# Patient Record
Sex: MALE | Race: WHITE
[De-identification: names, ages, dates, MRNs, and addresses within clinical notes are randomized per-mention and may not be internally consistent; named-entity substitution may affect disease eponyms.]

---

## 2023-08-04 ENCOUNTER — HOSPITAL ENCOUNTER (EMERGENCY)
Dept: HOSPITAL 33 - ED | Age: 58
Discharge: HOME | End: 2023-08-04
Payer: COMMERCIAL

## 2023-08-04 VITALS
OXYGEN SATURATION: 98 % | HEART RATE: 78 BPM | DIASTOLIC BLOOD PRESSURE: 77 MMHG | RESPIRATION RATE: 18 BRPM | SYSTOLIC BLOOD PRESSURE: 135 MMHG

## 2023-08-04 VITALS — TEMPERATURE: 98 F

## 2023-08-04 DIAGNOSIS — Z28.310: ICD-10-CM

## 2023-08-04 DIAGNOSIS — K86.2: ICD-10-CM

## 2023-08-04 DIAGNOSIS — S32.018A: ICD-10-CM

## 2023-08-04 DIAGNOSIS — Z72.0: ICD-10-CM

## 2023-08-04 DIAGNOSIS — R10.32: Primary | ICD-10-CM

## 2023-08-04 DIAGNOSIS — S22.088A: ICD-10-CM

## 2023-08-04 LAB
ALBUMIN SERPL-MCNC: 4.2 G/DL (ref 3.5–5)
ALP SERPL-CCNC: 86 U/L (ref 38–126)
ALT SERPL-CCNC: 27 U/L (ref 0–50)
ANION GAP SERPL CALC-SCNC: 13.2 MEQ/L (ref 5–15)
AST SERPL QL: 33 U/L (ref 17–59)
BACTERIA UR CULT: NO
BASOPHILS # BLD AUTO: 0.04 X10^3/UL (ref 0–0.4)
BASOPHILS NFR BLD AUTO: 0.6 % (ref 0–0.4)
BILIRUB BLD-MCNC: 0.8 MG/DL (ref 0.2–1.3)
BUN SERPL-MCNC: 14 MG/DL (ref 9–20)
CALCIUM SPEC-MCNC: 8.9 MG/DL (ref 8.4–10.2)
CHLORIDE SERPL-SCNC: 103 MMOL/L (ref 98–107)
CO2 SERPL-SCNC: 25 MMOL/L (ref 22–30)
CREAT SERPL-MCNC: 0.9 MG/DL (ref 0.66–1.25)
EOSINOPHIL # BLD AUTO: 0.07 X10^3/UL (ref 0–0.5)
GFR SERPLBLD BASED ON 1.73 SQ M-ARVRAT: > 60 ML/MIN
GLUCOSE SERPL-MCNC: 151 MG/DL (ref 74–106)
HCT VFR BLD AUTO: 41.8 % (ref 42–50)
HGB BLD-MCNC: 13.9 G/DL (ref 12.5–18)
IMM GRANULOCYTES # BLD: 0.01 X10^3U/L (ref 0–0.03)
IMM GRANULOCYTES NFR BLD: 0.1 % (ref 0–0.4)
LIPASE SERPL-CCNC: 100 U/L (ref 23–300)
LYMPHOCYTES # SPEC AUTO: 1.62 X10^3/UL (ref 1–4.6)
MCH RBC QN AUTO: 31.1 PG (ref 26–32)
MCHC RBC AUTO-ENTMCNC: 33.3 G/DL (ref 32–36)
MONOCYTES # BLD AUTO: 0.41 X10^3/UL (ref 0–1.3)
NRBC # BLD AUTO: 0 X10^3U/L (ref 0–0.01)
NRBC BLD AUTO-RTO: 0 % (ref 0–0.1)
PLATELET # BLD AUTO: 282 X10^3/UL (ref 150–450)
POTASSIUM SERPLBLD-SCNC: 3.8 MMOL/L (ref 3.5–5.1)
PROT SERPL-MCNC: 7.4 G/DL (ref 6.3–8.2)
RBC # BLD AUTO: 4.47 X10^6/UL (ref 4.1–5.6)
RBC # URNS HPF: (no result) /HPF (ref 0–5)
SODIUM SERPL-SCNC: 137 MMOL/L (ref 137–145)
WBC # BLD AUTO: 6.7 X10^3/UL (ref 4–10.5)
WBC URNS QL MICRO: (no result) /HPF (ref 0–5)

## 2023-08-04 PROCEDURE — 81001 URINALYSIS AUTO W/SCOPE: CPT

## 2023-08-04 PROCEDURE — 96360 HYDRATION IV INFUSION INIT: CPT

## 2023-08-04 PROCEDURE — 80053 COMPREHEN METABOLIC PANEL: CPT

## 2023-08-04 PROCEDURE — 36415 COLL VENOUS BLD VENIPUNCTURE: CPT

## 2023-08-04 PROCEDURE — 85025 COMPLETE CBC W/AUTO DIFF WBC: CPT

## 2023-08-04 PROCEDURE — 74177 CT ABD & PELVIS W/CONTRAST: CPT

## 2023-08-04 PROCEDURE — 83690 ASSAY OF LIPASE: CPT

## 2023-08-04 PROCEDURE — 36000 PLACE NEEDLE IN VEIN: CPT

## 2023-08-04 PROCEDURE — 99284 EMERGENCY DEPT VISIT MOD MDM: CPT

## 2023-08-04 NOTE — XRAY
CLINICAL HISTORY:lower abd pain

COMPARISON:None.

TECHNIQUE:CT scan of the abdomen and pelvis was performed with IV contrast. No

oral contrast. Coronal and sagittal reconstructive images were also obtained.

FINDINGS:

A scan through the lower chest reveals an unremarkable lung basis and heart.



Abdomen:

The liver is of average size and measures 15 cm. No focal or diffuse

parenchymal abnormality.

The portal vein, intrahepatic biliary radicals, and bile ducts are normal.

The gallbladder shows average capacity and no definite stones. There is no

evidence of wall thickening/ pericholecystic collection.

The pancreas is of normal size, with tiny hypodense areas in the body and

tail, with a cystic-like appearance. Also, a focal calcification in the

pancreatic tail is seen, most likely representing chronic pancreatitis S2

Im16).



The spleen, pancreas, and adrenal glands are unremarkable.

The kidneys are unremarkable. They are normal in size and shape. No calculi or

hydronephrosis.

The ascending colon, the transverse colon, and the descending colon visualized

small bowel loops are unremarkable. There is no evidence of significant

enlargement of the mesenteric or retroperitoneal lymph nodes.



Pelvis:

The urinary bladder is unremarkable.

The rectosigmoid colon is unremarkable.

The prostate is of average size. Central concretions noted.



The pelvic vasculature is unremarkable.

No evidence of pelvic lymphadenopathy.

Partial compression collapse of T12 and L1 vertebral bodies with almost 10%

loss of vertebral body height.

IMPRESSION:

1. Small cystic-like appearance areas in the pancreatic body and tail. These

may probably correspond to sequelae vs. IPNM. Further evaluation with

contrast-enhanced MR is recommended.

2. Pancreatic calcification, most likely corresponding to a sequela of chronic

pancreatitis.

3. Partial compression/collapse of upper end-plates of T12 and L1 vertebral

bodies. Further evaluation with MR is recommended.



_____________________________________





Electronically Signed by: Goyo Rodriguez MD. (08/04/2023 16:32:59 CST)

## 2023-08-04 NOTE — ERPHSYRPT
- History of Present Illness


Time Seen by Provider: 08/04/23 15:26


Source: patient


Exam Limitations: no limitations


Patient Subjective Stated Complaint: Abdominal pain/ left sided flank/rib pain


Triage Nursing Assessment: Patient ambulated back to ED and transferred self to 

bed. Patient A+O X 3. Patient's skin pink, warm and dry. Patient complains of 

abdominal pain for the past 6 months that has gotten worse. Patient complains of

constipation and bloating. Abdomen distended and round with BS X 4. Patient 

denies N/V.  Patient also complains of left sided flank pain after falling into 

a table a month ago. Patient complains of 6/10.


Physician History: 





57 years old male with history of off-and-on abdominal pain for quite some time 

presented in the ER with left flank and lower abdominal discomfort which is 

lately getting worse.  Patient reports dull aching pain all the time without any

significant aggravating or relieving factors.  Denies associated nausea vomiting

or diarrhea.  Denies any urinary complaints but was told that he has blood in 

the urine recently at another ER.  Patient reports he has injuries to left lower

ribs and has possible fractures almost 6 months ago.  Does report having 

constipation most of the times.


Allergies/Adverse Reactions: 








cephalexin [From Keflex] Allergy (Verified 08/04/23 15:29)


   





Home Medications: 








No Reportable Medications [No Reported Medications]  08/04/23 [History]





Hx Influenza Vaccination/Date Given: No


Hx Pneumococcal Vaccination/Date Given: No


Immunizations Up to Date: Yes





Travel Risk





- International Travel


Have you traveled outside of the country in past 3 weeks: No





- Coronavirus Screening


Are you exhibiting any of the following symptoms?: No


Close contact with a COVID-19 positive Pt in past 14-21 Days: No





- Vaccine Status


Have you recieved a Covid-19 vaccination: No





- Review of Systems


Constitutional: No Symptoms


Eyes: No Symptoms


Ears, Nose, & Throat: No Symptoms


Respiratory: No Symptoms


Cardiac: No Symptoms


Abdominal/Gastrointestinal: Abdominal Pain


Genitourinary Symptoms: No Symptoms


Musculoskeletal: Back Pain


Neurological: No Symptoms


Psychological: No Symptoms


Endocrine: No Symptoms





- Past Medical History


Pertinent Past Medical History: No


Neurological History: No Pertinent History


ENT History: No Pertinent History


Cardiac History: No Pertinent History


Respiratory History: No Pertinent History


Endocrine Medical History: No Pertinent History


Musculoskeletal History: No Pertinent History


GI Medical History: No Pertinent History


 History: No Pertinent History


Psycho-Social History: No Pertinent History


Male Reproductive Disorders: No Pertinent History


Other Medical History: HEP C





- Past Surgical History


Past Surgical History: No


Neuro Surgical History: No Pertinent History


Cardiac: No Pertinent History


Respiratory: No Pertinent History


Gastrointestinal: No Pertinent History


Genitourinary: No Pertinent History


Musculoskeletal: No Pertinent History


Male Surgical History: No Pertinent History





- Social History


Smoking Status: Current every day smoker


How long have you smoked: years


Exposure to second hand smoke: No


Drug Use: methamphetamines, cocaine


Patient Lives Alone: No





- Nursing Vital Signs


Nursing Vital Signs: 





                               Initial Vital Signs











Temperature  98.0 F   08/04/23 15:30


 


Pulse Rate  69   08/04/23 15:30


 


Respiratory Rate  18   08/04/23 15:30


 


Blood Pressure  132/79   08/04/23 15:30


 


O2 Sat by Pulse Oximetry  97   08/04/23 15:30








                                   Pain Scale











Pain Intensity                 5

















- Physical Exam


General Appearance: no apparent distress, alert


Eye Exam: PERRL/EOMI


Ears, Nose, Throat Exam: normal ENT inspection


Neck Exam: normal inspection, non-tender, supple, full range of motion


Respiratory Exam: normal breath sounds, lungs clear


Cardiovascular Exam: regular rate/rhythm, normal heart sounds


Gastrointestinal/Abdomen Exam: soft, normal bowel sounds, tenderness (Minimal 

tenderness in lower abdomen)


Back Exam: normal inspection, normal range of motion


Extremity Exam: normal inspection, normal range of motion


Neurologic Exam: alert, oriented x 3, cooperative


Skin Exam: normal color


**SpO2 Interpretation**: normal


SpO2: 98


O2 Delivery: Room Air


Ordered Tests: 





                               Active Orders 24 hr











 Category Date Time Status


 


 IV Insertion STAT Care  08/04/23 16:04 Active


 


 NPO (ED) STAT Care  08/04/23 16:04 Active


 


 ABDOMEN AND PELVIS W CONTRAST [CT] Stat Exams  08/04/23 16:05 Completed


 


 CBC W DIFF Stat Lab  08/04/23 15:45 Completed


 


 CMP Stat Lab  08/04/23 15:45 Completed


 


 LIPASE Stat Lab  08/04/23 15:45 Completed


 


 UA W/RFX UR CULTURE Stat Lab  08/04/23 17:10 Completed








Medication Summary














Discontinued Medications














Generic Name Dose Route Start Last Admin





  Trade Name Freq  PRN Reason Stop Dose Admin


 


Sodium Chloride  1,000 mls @ 999 mls/hr  08/04/23 16:04  08/04/23 17:40





  Sodium Chloride 0.9% 1000 Ml  IV  08/04/23 17:04  Infused





  .Q1H1M STA   Infusion


 


Sodium Chloride  Confirm  08/04/23 16:40 





  Sodium Chloride 0.9% 1000 Ml  Administered  08/04/23 16:41 





  Dose  





  1,000 mls @ ud  





  .ROUTE  





  .K-MED ONE  











Lab/Rad Data: 





                           Laboratory Result Diagrams





                                 08/04/23 15:45 





                                 08/04/23 15:45 





                               Laboratory Results











  08/04/23 08/04/23 08/04/23 Range/Units





  17:10 15:45 15:45 


 


WBC    6.7  (4.0-10.5)  x10^3/uL


 


RBC    4.47  (4.1-5.6)  x10^6/uL


 


Hgb    13.9  (12.5-18.0)  g/dL


 


Hct    41.8 L  (42-50)  %


 


MCV    93.5  ()  fL


 


MCH    31.1  (26-32)  pg


 


MCHC    33.3  (32-36)  g/dL


 


RDW    12.6  (11.5-14.0)  %


 


Plt Count    282  (150-450)  x10^3/uL


 


MPV    9.8  (7.5-11.0)  fL


 


Gran %    67.9 H  (36.0-66.0)  %


 


Immature Gran % (Auto)    0.1  (0.00-0.4)  %


 


Nucleat RBC Rel Count    0.0  (0.00-0.1)  %


 


Eos # (Auto)    0.07  (0-0.5)  x10^3/uL


 


Immature Gran # (Auto)    0.01  (0.00-0.03)  x10^3u/L


 


Absolute Lymphs (auto)    1.62  (1.0-4.6)  x10^3/uL


 


Absolute Monos (auto)    0.41  (0.0-1.3)  x10^3/uL


 


Absolute Nucleated RBC    0.00  (0.00-0.01)  x10^3u/L


 


Lymphocytes %    24.3  (24.0-44.0)  %


 


Monocytes %    6.1  (0.0-12.0)  %


 


Eosinophils %    1.0  (0.00-5.0)  %


 


Basophils %    0.6  (0.0-0.4)  %


 


Absolute Granulocytes    4.53  (1.4-6.9)  x10^3/uL


 


Basophils #    0.04  (0-0.4)  x10^3/uL


 


Sodium   137   (137-145)  mmol/L


 


Potassium   3.8   (3.5-5.1)  mmol/L


 


Chloride   103   ()  mmol/L


 


Carbon Dioxide   25   (22-30)  mmol/L


 


Anion Gap   13.2   (5-15)  MEQ/L


 


BUN   14   (9-20)  mg/dL


 


Creatinine   0.90   (0.66-1.25)  mg/dL


 


Estimated GFR   > 60.0   ML/MIN


 


Glucose   151 H   ()  mg/dL


 


Calcium   8.9   (8.4-10.2)  mg/dL


 


Total Bilirubin   0.80   (0.2-1.3)  mg/dL


 


AST   33   (17-59)  U/L


 


ALT   27   (0-50)  U/L


 


Alkaline Phosphatase   86   ()  U/L


 


Serum Total Protein   7.4   (6.3-8.2)  g/dL


 


Albumin   4.2   (3.5-5.0)  g/dL


 


Lipase   100   ()  U/L


 


Urine Color  Yellow    (Yellow)  


 


Urine Appearance  Clear    (Clear)  


 


Urine pH  6.5    (4.6-8.0)  


 


Ur Specific Gravity  >=1.030 A    (1.005-1.030)  


 


Urine Protein  Negative    (Negative)  


 


Urine Glucose (UA)  Negative    (Negative)  mg/dL


 


Urine Ketones  Negative    (Negative)  


 


Urine Blood  Negative    (Negative)  


 


Urine Nitrite  Negative    (Negative)  


 


Urine Bilirubin  Negative    (Negative)  


 


Urine Urobilinogen  0.2    (0.2)  mg/dL


 


Ur Leukocyte Esterase  Negative    (Negative)  


 


U Hyaline Cast (Auto)  NONE SEEN    (0-2)  /LPF


 


Urine Microscopic RBC  0-2    (0-5)  /HPF


 


Urine Microscopic WBC  0-2    (0-5)  /HPF


 


Ur Epithelial Cells  None Seen    (None Seen)  /HPF


 


Urine Bacteria  None Seen    (None Seen)  /HPF


 


Urine Culture Reflexed  NO    (NO)  














- Progress


Progress Note: 





08/04/23 18:08


57 years old male with history of off-and-on abdominal pain for quite some time 

presented in the ER with left flank and lower abdominal discomfort which is 

lately getting worse.  Patient reports dull aching pain all the time without any

 significant aggravating or relieving factors.  Denies associated nausea 

vomiting or diarrhea.  Denies any urinary complaints but was told that he has 

blood in the urine recently at another ER.  Patient reports he has injuries to 

left lower ribs and has possible fractures almost 6 months ago.  Does report 

having constipation most of the times.


Patient later on did report that his heavy 4 nagy fell on top of him and hit 

in the abdomen couple of weeks ago.


Patient is not in any distress.  Abdominal exam is soft with mild tenderness in 

lower abdomen.  Bowel sounds positive in all 4 quadrants.  Patient does have 

left lower ribs tenderness as well.  Has chronic pain after he fell off of the 

stairs in the lower back which is not any worse than usual and negative neuro 

exam in lower extremities.


I have given him symptomatic treatment along with fluids and acute abdomen work-

up is done.  Has normal white count, fairly unremarkable chemistries, normal 

lipase and no UTI.  CT abdomen pelvis did show some cystic changes in the head 

and tail of pancreas with suspicion for I PNM/intraductal papillary mucinous 

neoplasm.  No peripancreatic fluids.  No gallstone.  No other acute intra-

abdominal pelvic findings.  It is possible patient has recent contusion of 

abdominal wall causing pain.  Patient CT also showed compression fracture of T12

 and L1 with 10% height loss.  This is not an acute finding and patient is not 

here for this issue.  I do not see any other explanation for his symptoms.  

Recommended taking Tylenol ibuprofen and could have some element of constipation

 for which she is recommended to take stool softener or MiraLAX.  For IPN 

M/pancreatic cyst I have recommended outpatient follow-up with primary care and 

dedicated MR evaluation for pancreas.  I have discussed the results of work-up 

with patient and family and need for MR and they seem understanding.  At this 

point I do not think needs any other work-up and is stable for discharge.  

Discussed signs symptoms of worsening needing return to ER which she seems unde

rstanding.


Counseled pt/family regarding: lab results, diagnosis, need for follow-up, rad 

results





Medical Desision Making





- Independent Historian


Additional History obtained from: Family





- Diagnostic Testing


Radiological Interpretation: Reviewed by me, Teleradiologist Report





- Departure


Departure Disposition: Home


Clinical Impression: 


 Lower abdominal pain, Pancreatic cyst, Compression fracture





Condition: Stable


Critical Care Time: No


Referrals: 


TYLER CORTES NP [Primary Care Provider] - Follow Up with PCP/3 days


Instructions:  Severe Abdominal Pain, Adult (DC)


Additional Instructions: 


Follow-up with primary care for reevaluation and need MRI of pancreas to know fu

rther details about pancreatic cyst.  Take daily MiraLAX and stool softener.  

Take Tylenol/ibuprofen as needed for pain.  Return to ER for intractable pain, 

vomiting etc.